# Patient Record
Sex: MALE | Race: BLACK OR AFRICAN AMERICAN | URBAN - METROPOLITAN AREA
[De-identification: names, ages, dates, MRNs, and addresses within clinical notes are randomized per-mention and may not be internally consistent; named-entity substitution may affect disease eponyms.]

---

## 2022-02-28 ENCOUNTER — DOCUMENTATION ONLY (OUTPATIENT)
Dept: PULMONOLOGY | Age: 37
End: 2022-02-28

## 2022-03-11 ENCOUNTER — OFFICE VISIT (OUTPATIENT)
Dept: PULMONOLOGY | Age: 37
End: 2022-03-11
Payer: OTHER GOVERNMENT

## 2022-03-11 VITALS
WEIGHT: 237 LBS | SYSTOLIC BLOOD PRESSURE: 136 MMHG | OXYGEN SATURATION: 97 % | RESPIRATION RATE: 16 BRPM | BODY MASS INDEX: 33.93 KG/M2 | TEMPERATURE: 97.6 F | HEIGHT: 70 IN | HEART RATE: 91 BPM | DIASTOLIC BLOOD PRESSURE: 89 MMHG

## 2022-03-11 DIAGNOSIS — R09.81 NASAL CONGESTION: ICD-10-CM

## 2022-03-11 DIAGNOSIS — R06.83 SNORING: ICD-10-CM

## 2022-03-11 DIAGNOSIS — J35.1 TONSILLAR HYPERTROPHY: Primary | ICD-10-CM

## 2022-03-11 DIAGNOSIS — M79.671 RIGHT FOOT PAIN: ICD-10-CM

## 2022-03-11 DIAGNOSIS — J34.2 DEVIATED SEPTUM: ICD-10-CM

## 2022-03-11 DIAGNOSIS — G47.8 SLEEP PARALYSIS: ICD-10-CM

## 2022-03-11 DIAGNOSIS — G47.10 HYPERSOMNIA: ICD-10-CM

## 2022-03-11 DIAGNOSIS — R06.81 WITNESSED EPISODE OF APNEA: ICD-10-CM

## 2022-03-11 DIAGNOSIS — E66.9 OBESITY (BMI 30-39.9): ICD-10-CM

## 2022-03-11 PROCEDURE — 99204 OFFICE O/P NEW MOD 45 MIN: CPT | Performed by: INTERNAL MEDICINE

## 2022-03-11 RX ORDER — BISMUTH SUBSALICYLATE 262 MG
1 TABLET,CHEWABLE ORAL DAILY
COMMUNITY

## 2022-03-11 NOTE — PATIENT INSTRUCTIONS
Please call our clinic back at [phone] if you have not received a follow up appointment within 30 days prior the recommended follow up time. Please also call our office if you are not tolerating treatment plan and/or if you are experiencing any difficulties with the [employer]  [employer] you may be using or is assigned to you. If you have a CPAP/BIPAP or home ventilator device, your [employer] company is supposed to provide you with replacement filters, tubing and masks. You can either call them when you need new supplies or you can arrange for an automatic shipment schedule. Your need to be seen by our office at least annually to renew the prescription for these supplies. Please make note of who your DME company is and their phone number. Please make sure that you clean your mask and hosing on a regular basis.   Your DME can provide you with additional information regarding proper care and cleaning of your device- Thank you      I have ordered for you a home sleep study and I will recommend an ENT consult

## 2022-03-11 NOTE — PROGRESS NOTES
[facility]  Pulmonary, Critical Care, and Sleep Medicine    Office Progress Note- Initial Evaluation      Primary Care Physician: TRAVIS Liriano     Reason for Visit:  Evaluation for possible HERMELINDO    Assessment:  1. Snoring: Patient has symptoms and exam findings highly suggestive of a sleep breathing disorder, such as HERMELINDO. Given severity of symptoms and comorbidities additional in-lab sleep testing is indicated. 2. Excessive Daytime Sleepiness (EDS)/ Hypersomnia: [place]: 17/24  3. H/O Vivid Dreams and Sleep Paralysis- Monitor for now and reassess after sleep testing completed. May possibly require hypersomnia workup at future date. 4. Tonsillar hypertrophy  5. Deviated Septum with nasal congestion   6. Obesity: Body mass index is 34.01 kg/m². 7. Right foot pain:  S/P  surgery x 2  8. Pending medical separation from [facility] Figueroa . H/O epistaxis with nasal steroids       Plan:  · ENT consultation to assess tonsils and deviated septum is recommended  · Schedule patient for home sleep study for further evaluation. · Potential consequences of untreated sleep apnea, and/or excessive daytime sleepiness were discussed with the patient. · Educational materials provided. · Treatment options including CPAP, dental appliance, weight reduction measures, positional therapy, surgeries etc were discussed. · Healthy lifestyle changes to include weight loss and exercise discussed. · Healthy sleep habits were reviewed and encouraged. ·  and workplace safety reviewed and discussed as appropriate. Drowsy and/or inattentive driving should be avoided. · Follow up with Primary Care Provider (PCP) as directed and for routine health care maintenance. · Follow-up: after sleep testing, sooner should new symptoms or problems arise. History of Present Illness: Mr. Lois Nicole is a 29 y.o. male patient who presents for evaluation for possible sleep breathing disorder.  The history was provided by the patient. Occupation:   [employer]- [employer]- restricted work duties and schedule pending separation                    Work Schedule: [id number]   Shift work: in the past    Driving:  no    Motor vehicle accident(s) associated with drowsy driving have not occurred. Snoring: is a problem. This is a Chronic problem which has been ongoing for years. 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 Witnessed apneas are reported by his girlfriend    Fatigue: is a problem. This is a Chronic problem which has been ongoing for years. Dental: Teeth clenching or grinding is not reported. Naps: are reported periodically- mostly spontaneous. Leg Symptoms: He does not have unpleasant or crawling sensation in legs or strong urge to move when inactive. Pain: Pain, typically does not disturb their sleep. GERD: is not reported. Mood: The patient describes their mood as: Happy. He is grieving the death of his mom in March 2020 and is still dealing with her affairs. Sleep-Wake History:     Estimates sleeping approximately 6-7 hours per night/day. Reports sleeping in a Bed or ouch with 2 pillows under their head. He gets into bed at approximately 2230. Once in bed,he goes to sleep. It usually takes up to 30 minutes to fall asleep after going to bed. He normally awakens with an alarm to start his day at 0530. He  typically gets out of bed 10 minutes after awakening . Reports waking up from sleep to use the bathroom 1 time(s). Vivid dreams are reported, several nights per week. Waking up from sleep with a headache is not reported. Awakening with a dry mouth is reported. Symptom(s) suggestive of cataplexy are not reported. Sleep paralysis is reported but more than 10 years ago    Hallucinations: is not  reported. Sleep walking is not  reported. Sleep talking is reported.     Other unusual and/or parasomnia behaviors are not reported. Family Sleep History:   None        Stop Ne Ahuja 3/11/2022   Does the patient snore loudly (louder than talking or loud enough to be heard through closed doors)? 1   Does the patient often feel tired, fatigued, or sleepy during the daytime, even after a \"good\" night's sleep? 1   Has anyone ever observed the patient stop breathing during their sleep? 1   Does the patient have or are they being treated for high blood pressure? 0   Is the patient's BMI greater than 35? 0   Is your neck circumference greater than 17 inches (Male) or 16 inches (Female)? 0   Is the patient older than 48? 0   Is the patient male? 1   HERMELINDO Score 4   Has the patient been referred to Sleep Medicine? 1   Has the patient previously been diagnosed with Obstructive Sleep Apnea? 0       3 most recent PHQ Screens 3/11/2022   Little interest or pleasure in doing things Not at all   Feeling down, depressed, irritable, or hopeless Not at all   Total Score PHQ 2 0       [place] Scale 3/11/2022   Sitting and Reading 2   Watching TV 2   Sitting, inactive in a public place (e.g. a movie theater or meeting) 2   As a passenger in a car for an hour, without a break 3   Lying down to rest in the afternoon, when circumstances permit 3   Sitting and talking to someone 2   Sitting quietly after lunch without alcohol 2   In a car, while stopped for a few minutes in traffic 1   [place] Sleepiness Score 17             Immunization History: There is no immunization history on file for this patient. Past Medical History:  No past medical history on file. Past Surgical History:  Past Surgical History:   Procedure Laterality Date    HX ORTHOPAEDIC Right     right foot X 2       Family History:  No family history on file. Social History:  Social History     Tobacco Use    Smoking status: Never Smoker    Smokeless tobacco: Never Used   Vaping Use    Vaping Use: Never used   Substance Use Topics    Alcohol use:  Yes Comment: special occasions    Drug use: Never        Caffeine Amount Time of last Intake Comments   Coffee 1  Q 3 days [place]    Soda 2/day  Clear Sodas   Tea Occasional     Energy Drinks Rare  Bang-had one yesterday   Over- the - counter stimulant pills None     Other Substances      Alcohol Social  - last drink > 1year ago   Tobacco None     Drugs None     Other: [employer]         Medications:  Current Outpatient Medications on File Prior to Visit   Medication Sig Dispense Refill    multivitamin (ONE A DAY) tablet Take 1 Tablet by mouth daily. No current facility-administered medications on file prior to visit. Allergy:  No Known Allergies    Review of Systems  General ROS: positive for  - fatigue and sleep disturbance  negative for - chills, fever, hot flashes, malaise or night sweats  ENT ROS: negative for - epistaxis, oral lesions, sinus pain, sneezing, sore throat, tinnitus, vertigo, visual changes or vocal changes, Positive for- wears glasses/contacts, chronic nasal congestion, nosebleeds from nasal steroids  Hematological and Lymphatic ROS: negative for - bleeding problems, blood clots, bruising, jaundice, pallor or swollen lymph nodes  Endocrine ROS: negative for - polydipsia/polyuria, skin changes, temperature intolerance or unexpected weight changes  Respiratory ROS: no cough, shortness of breath, or wheezing  Cardiovascular ROS: no chest pain or dyspnea on exertion  Gastrointestinal ROS: no abdominal pain, change in bowel habits, or black or bloody stools  Genito-Urinary ROS: no dysuria, trouble voiding, or hematuria  Musculoskeletal ROS: right foot pain  Neurological ROS: no TIA or stroke symptoms  Dermatological ROS: negative for - pruritus, rash or skin lesion changes   Psychological ROS: as noted above   Otherwise negative and per HPI      Physical Exam:  Blood pressure 136/89, pulse 91, temperature 97.6 °F (36.4 °C), temperature source Temporal, resp.  rate 16, height 5' 10\" (1.778 m), weight 107.5 kg (237 lb), SpO2 97 %. on RA, Body mass index is 34.01 kg/m². Neck circ. in \"inches\": 17    General: No distress, acyanotic, appears stated age, cooperative, pleasant  HEENT: PERRL, EOMI, throat without erythema or exudate, Tongue- with  dental indention on tongue, Mallampati's score 3+, Uvula- midline, Tonsils- 3- without acute inflammation, + deviated septum with nasal congestion- right nares more congested than left  Neck: Supple,  no abnormally enlarged lymph nodes, thyroid is not enlarged, non-tender, No JVD, No carotid bruit  Chest: Grossly ormal.  Lungs: Moderate air entry, clear to auscultation bilaterally,   Heart: Regular rate and rhythm, S1S2 present, without murmur. Abdomen: Protuberant, abdomen is soft without significant tenderness, or guarding. Extremity: Negative for cyanosis, edema, or clubbing. Skin: Skin color, texture, turgor normal. No rashes or lesions. Neurological: CN 2-12 grossly intact, normal muscle tone. Data Reviewed:  CBC: No results found for: WBC, WBCLT, HGBPOC, HGB, HGBP, HCTPOC, HCT, PHCT, RBCH, PLT, MCV, HGBEXT, HCTEXT, PLTEXT    BMP: No results found for: NA, K, CL, CO2, AGAP, GLU, BUN, CREA, BUCR, GFRAA, GFRNA, CA, GFRAA     TSH:  No results found for: TSH, TSH2, TSH3, TSHP, TSHELE, TSHEXT    Imaging:  [x]I have personally reviewed the patients radiographs section   No results found for this or any previous visit. No results found for this or any previous visit.        Cardiac Echo:       Historical Sleep Testing Data:        Gabby Felix DO, FCCP  Pulmonary, Sleep and Critical Care Medicine

## 2022-03-11 NOTE — LETTER
3/11/2022    Patient: Herber Berumen   YOB: 1985   Date of Visit: 3/11/2022     Elyce Blizzard, NP-C  [address] [place] [address]  Via Fax: [phone]     River Saucedo NP  [address] [address]  Via Fax: [phone]    Dear Elyce Blizzard, NP-C Samuel Peal, NP,      Thank you for referring Mr. Herber Berumen to [address] [place] [address] for evaluation. My notes for this consultation are attached. If you have questions, please do not hesitate to call me. I look forward to following your patient along with you.       Sincerely,    Fantasma Turner, DO

## 2022-03-11 NOTE — PROGRESS NOTES
Herber Berumen presents today for   Chief Complaint   Patient presents with    New Patient    Snoring    Fatigue    Witnessed Apnea       Is someone accompanying this pt? no    Is the patient using any DME equipment during OV? no    -DME Company n/a    Have you ever had a sleep study done before? No    Depression Screening:  3 most recent PHQ Screens 3/11/2022   Little interest or pleasure in doing things Not at all   Feeling down, depressed, irritable, or hopeless Not at all   Total Score PHQ 2 0       [place] Sleepiness Scale:  [place] Sleepiness Scale  3/11/2022   Sitting and Reading 2   Watching TV 2   Sitting, inactive in a public place (e.g. a movie theater or meeting) 2   As a passenger in a car for an hour, without a break 3   Lying down to rest in the afternoon, when circumstances permit 3   Sitting and talking to someone 2   Sitting quietly after lunch without alcohol 2   In a car, while stopped for a few minutes in traffic 1   [place] Sleepiness Score 17       Stop-Bang:  Stop Edith Armentah 3/11/2022   Does the patient snore loudly (louder than talking or loud enough to be heard through closed doors)? 1   Does the patient often feel tired, fatigued, or sleepy during the daytime, even after a \"good\" night's sleep? 1   Has anyone ever observed the patient stop breathing during their sleep? 1   Does the patient have or are they being treated for high blood pressure? 0   Is the patient's BMI greater than 35? 0   Is your neck circumference greater than 17 inches (Male) or 16 inches (Female)? 0   Is the patient older than 48? 0   Is the patient male? 1   HERMELINDO Score 4   Has the patient been referred to Sleep Medicine? 1   Has the patient previously been diagnosed with Obstructive Sleep Apnea? 0       Neck Circumference:  17\"      Coordination of Care:  1. Have you been to the ER, urgent care clinic since your last visit? Hospitalized since your last visit? No    2.  Have you seen or consulted any other health care providers outside of the [address] [place] [address] since your last visit? Include any pap smears or colon screening. n/a    Medication list has been updated according to patient.

## 2022-04-14 ENCOUNTER — HOSPITAL ENCOUNTER (OUTPATIENT)
Dept: SLEEP MEDICINE | Age: 37
Discharge: HOME | End: 2022-04-14
Payer: OTHER GOVERNMENT

## 2022-04-14 DIAGNOSIS — E66.9 OBESITY (BMI 30-39.9): ICD-10-CM

## 2022-04-14 DIAGNOSIS — R06.83 SNORING: ICD-10-CM

## 2022-04-14 DIAGNOSIS — R06.81 WITNESSED EPISODE OF APNEA: ICD-10-CM

## 2022-04-14 DIAGNOSIS — J35.1 TONSILLAR HYPERTROPHY: ICD-10-CM

## 2022-04-14 PROCEDURE — 95806 SLEEP STUDY UNATT&RESP EFFT: CPT

## 2022-04-15 PROCEDURE — 95806 SLEEP STUDY UNATT&RESP EFFT: CPT

## 2022-04-16 DIAGNOSIS — G47.33 OSA (OBSTRUCTIVE SLEEP APNEA): Primary | ICD-10-CM

## 2022-05-03 ENCOUNTER — TELEPHONE (OUTPATIENT)
Dept: PULMONOLOGY | Age: 37
End: 2022-05-03

## 2022-05-03 NOTE — TELEPHONE ENCOUNTER
APAP 8/15 order faxed to [address] [place] [address], [phone]. Patient contacted and study results reviewed with him. DME info provided to patient and he is encouraged to contact our office with any additional questions or concerns he may have. Patient should hear from [address] [place] [address] within 2 - 3 weeks. If not, I have requested they call the office to let me know. Due to the current situations with the Xiang recall and COVID pandemic, we are experiencing a back log in CPAP & BiPAP setups. At this time there is a minimum time frame of 6-8 week set up. Patient has been advised of this and has verbalized understanding.

## 2022-05-03 NOTE — TELEPHONE ENCOUNTER
APAP 8/15 order faxed to [facility], [phone]. Patient contacted and study results reviewed with him. DME info provided to patient and he is encouraged to contact our office with any additional questions or concerns he may have. Patient should hear from [facility] within 2 - 3 weeks. If not, I have requested they call the office to let me know. Due to the current situations with the Xiang recall and COVID pandemic, we are experiencing a back log in CPAP & BiPAP setups. At this time there is a minimum time frame of 6-8 week set up. Patient has been advised of this and has verbalized understanding.

## 2022-07-27 ENCOUNTER — TELEPHONE (OUTPATIENT)
Dept: PULMONOLOGY | Age: 37
End: 2022-07-27

## 2022-07-27 NOTE — TELEPHONE ENCOUNTER
Pt called([phone]). Please call pt back regarding cpap. He has never gotten a call as to when he is to get his machine.

## 2023-10-11 ENCOUNTER — OFFICE VISIT (OUTPATIENT)
Age: 38
End: 2023-10-11
Payer: OTHER GOVERNMENT

## 2023-10-11 VITALS
HEIGHT: 70 IN | OXYGEN SATURATION: 97 % | BODY MASS INDEX: 34.22 KG/M2 | WEIGHT: 239 LBS | TEMPERATURE: 98.3 F | SYSTOLIC BLOOD PRESSURE: 140 MMHG | HEART RATE: 96 BPM | RESPIRATION RATE: 18 BRPM | DIASTOLIC BLOOD PRESSURE: 85 MMHG

## 2023-10-11 DIAGNOSIS — F41.9 ANXIETY: ICD-10-CM

## 2023-10-11 DIAGNOSIS — Z86.69 HISTORY OF SLEEP APNEA: ICD-10-CM

## 2023-10-11 DIAGNOSIS — E66.09 CLASS 1 OBESITY DUE TO EXCESS CALORIES WITHOUT SERIOUS COMORBIDITY WITH BODY MASS INDEX (BMI) OF 34.0 TO 34.9 IN ADULT: ICD-10-CM

## 2023-10-11 DIAGNOSIS — J35.1 HYPERTROPHY OF TONSILS: ICD-10-CM

## 2023-10-11 DIAGNOSIS — R06.83 LOUD SNORING: Primary | ICD-10-CM

## 2023-10-11 DIAGNOSIS — I10 ELEVATED BLOOD PRESSURE READING IN OFFICE WITH DIAGNOSIS OF HYPERTENSION: ICD-10-CM

## 2023-10-11 PROCEDURE — 99204 OFFICE O/P NEW MOD 45 MIN: CPT | Performed by: OTOLARYNGOLOGY

## 2023-10-11 PROCEDURE — 3077F SYST BP >= 140 MM HG: CPT | Performed by: OTOLARYNGOLOGY

## 2023-10-11 PROCEDURE — 3079F DIAST BP 80-89 MM HG: CPT | Performed by: OTOLARYNGOLOGY

## 2023-10-11 ASSESSMENT — ENCOUNTER SYMPTOMS
SNORING: 1
BACK PAIN: 0
SORE THROAT: 1
ORTHOPNEA: 0
SLEEP DISTURBANCES DUE TO BREATHING: 1

## 2023-10-11 ASSESSMENT — SLEEP AND FATIGUE QUESTIONNAIRES
HOW LIKELY ARE YOU TO NOD OFF OR FALL ASLEEP WHILE SITTING AND READING: 3
HOW LIKELY ARE YOU TO NOD OFF OR FALL ASLEEP WHILE SITTING QUIETLY AFTER LUNCH WITHOUT ALCOHOL: 0
HOW LIKELY ARE YOU TO NOD OFF OR FALL ASLEEP IN A CAR, WHILE STOPPED FOR A FEW MINUTES IN TRAFFIC: 0
ESS TOTAL SCORE: 7
HOW LIKELY ARE YOU TO NOD OFF OR FALL ASLEEP WHILE SITTING AND TALKING TO SOMEONE: 0
HOW LIKELY ARE YOU TO NOD OFF OR FALL ASLEEP WHILE WATCHING TV: 0
HOW LIKELY ARE YOU TO NOD OFF OR FALL ASLEEP WHILE SITTING INACTIVE IN A PUBLIC PLACE: 0
NECK CIRCUMFERENCE (INCHES): 15
HOW LIKELY ARE YOU TO NOD OFF OR FALL ASLEEP WHILE LYING DOWN TO REST IN THE AFTERNOON WHEN CIRCUMSTANCES PERMIT: 2
HOW LIKELY ARE YOU TO NOD OFF OR FALL ASLEEP WHEN YOU ARE A PASSENGER IN A CAR FOR AN HOUR WITHOUT A BREAK: 2

## 2023-10-11 ASSESSMENT — PATIENT HEALTH QUESTIONNAIRE - PHQ9
2. FEELING DOWN, DEPRESSED OR HOPELESS: 2
SUM OF ALL RESPONSES TO PHQ QUESTIONS 1-9: 2

## 2023-10-11 NOTE — PROGRESS NOTES
Trenton Dalton presents today for   Chief Complaint   Patient presents with    Sleep Apnea    Snoring    Sleep Problem       Is someone accompanying this pt? no    Is the patient using any DME equipment during OV? no    -[id number]     Have you ever had a sleep study done before? no    Depression Screening:      10/11/2023     2:37 PM   PHQ-9    Feeling down, depressed, or hopeless 2   PHQ-9 Total Score 2        [place] Sleepiness Scale:      10/11/2023     2:43 PM   Sleep Medicine   Sitting and reading 3   Watching TV 0   Sitting, inactive in a public place (e.g. a theatre or a meeting) 0   As a passenger in a car for an hour without a break 2   Lying down to rest in the afternoon when circumstances permit 2   Sitting and talking to someone 0   Sitting quietly after a lunch without alcohol 0   In a car, while stopped for a few minutes in traffic 0   [place] Sleepiness Score 7   Neck circumference (Inches) 15       Stop-Bang:      10/11/2023     2:00 PM   STOP-BANG QUESTIONNAIRE   Are you a loud and/or regular snorer? 1   Do you often feel tired or groggy upon awakening or do you awaken with a headache? 1   Have you been observed to gasp or stop breathing during sleep? 1   Are you often tired or fatigued during wake time hours? 0   Do you fall asleep sitting, reading, watching TV or driving? 0   Do you often have problems with memory or concentration? 0   Do you have or are you being treated for high blood pressure? 0   Recent BMI (Calculated) 34.1   Is BMI greater than 35 kg/m2? 0=No   Age older than 48years old? 0=No   Is your neck circumference greater than 17 inches (Male) or 16 inches (Female)? 0   Gender - Male 1=Yes   STOP-Bang Total Score 38.1         Coordination of Care:  1. Have you been to the ER, urgent care clinic since your last visit? Hospitalized since your last visit? no    2.  Have you seen or consulted any other health care providers outside of the [address] [place] [address] Avenue since your last

## 2023-10-11 NOTE — PROGRESS NOTES
[facility]  Sleep Medicine    Office Progress Note - Initial Evaluation      Primary Care Physician: VICENTA Rosado - NP     Reason for Visit: Evaluation for obstructive sleep apnea/sleep disordered breathing    Assessment:  1. Loud snoring  2. History of sleep apnea -was moderate in nature, was compliant with CPAP and was getting benefit from use until he got his machine taken away due to insurance change  3. Hypertrophy of tonsils  4. Anxiety  Comments:  Needs to continue to follow up with his mental health provider  5. Class 1 obesity due to excess calories without serious comorbidity with body mass index (BMI) of 34.0 to 34.9 in adult -weight loss would certainly help with AMADOR potentially  6. Elevated blood pressure reading in office with diagnosis of hypertension - Should be follow up PCP     We will order a home sleep apnea test to get an updated diagnosis for him. At that point, his desire is to go to the [address] [place] [address] and get care there for a new CPAP machine etc.  Once we get his sleep study done, I will have him pick it up so he can take it with him to an appointment in order to move on with getting his obstructive sleep apnea treated. We did discuss not driving sleepy, following up with his PCP and mental health provider as previously recommended. Orders Placed This Encounter   Procedures    HOME SLEEP STUDY (23178)      Plan: HSAT    Potential consequences of untreated sleep apnea, and/or excessive daytime sleepiness were discussed with the patient. Educational materials provided as appropriate  Treatment options including CPAP, dental appliance, weight reduction measures, positional therapy, surgeries etc were at least briefly discussed. Healthy lifestyle changes to include weight loss and exercise discussed. Healthy sleep habits were reviewed and encouraged.  and workplace safety reviewed and discussed as appropriate.  Drowsy and/or inattentive driving

## 2023-10-18 ENCOUNTER — HOSPITAL ENCOUNTER (OUTPATIENT)
Dept: SLEEP MEDICINE | Facility: HOSPITAL | Age: 38
Discharge: HOME | End: 2023-10-21
Attending: OTOLARYNGOLOGY
Payer: OTHER GOVERNMENT

## 2023-10-18 DIAGNOSIS — R06.83 LOUD SNORING: ICD-10-CM

## 2023-10-18 DIAGNOSIS — I10 ELEVATED BLOOD PRESSURE READING IN OFFICE WITH DIAGNOSIS OF HYPERTENSION: ICD-10-CM

## 2023-10-18 DIAGNOSIS — J35.1 HYPERTROPHY OF TONSILS: ICD-10-CM

## 2023-10-18 DIAGNOSIS — Z86.69 HISTORY OF SLEEP APNEA: ICD-10-CM

## 2023-10-18 DIAGNOSIS — E66.09 CLASS 1 OBESITY DUE TO EXCESS CALORIES WITHOUT SERIOUS COMORBIDITY WITH BODY MASS INDEX (BMI) OF 34.0 TO 34.9 IN ADULT: ICD-10-CM

## 2023-10-18 PROCEDURE — 95800 SLP STDY UNATTENDED: CPT

## 2023-11-16 ENCOUNTER — CLINICAL DOCUMENTATION (OUTPATIENT)
Age: 38
End: 2023-11-16